# Patient Record
Sex: MALE | Race: BLACK OR AFRICAN AMERICAN | NOT HISPANIC OR LATINO | Employment: STUDENT | URBAN - METROPOLITAN AREA
[De-identification: names, ages, dates, MRNs, and addresses within clinical notes are randomized per-mention and may not be internally consistent; named-entity substitution may affect disease eponyms.]

---
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
## 2024-01-16 ENCOUNTER — OFFICE VISIT (OUTPATIENT)
Dept: FAMILY MEDICINE | Facility: CLINIC | Age: 19
End: 2024-01-16
Payer: MEDICAID

## 2024-01-16 VITALS
BODY MASS INDEX: 29.19 KG/M2 | HEART RATE: 90 BPM | WEIGHT: 192 LBS | OXYGEN SATURATION: 99 % | TEMPERATURE: 98 F | SYSTOLIC BLOOD PRESSURE: 136 MMHG | DIASTOLIC BLOOD PRESSURE: 80 MMHG

## 2024-01-16 DIAGNOSIS — J06.9 VIRAL URI: Primary | ICD-10-CM

## 2024-01-16 PROCEDURE — 99213 OFFICE O/P EST LOW 20 MIN: CPT | Mod: ,,, | Performed by: PEDIATRICS

## 2024-01-16 PROCEDURE — 3075F SYST BP GE 130 - 139MM HG: CPT | Mod: CPTII,,, | Performed by: PEDIATRICS

## 2024-01-16 PROCEDURE — 3079F DIAST BP 80-89 MM HG: CPT | Mod: CPTII,,, | Performed by: PEDIATRICS

## 2024-01-16 PROCEDURE — 3008F BODY MASS INDEX DOCD: CPT | Mod: CPTII,,, | Performed by: PEDIATRICS

## 2024-01-16 PROCEDURE — 1160F RVW MEDS BY RX/DR IN RCRD: CPT | Mod: CPTII,,, | Performed by: PEDIATRICS

## 2024-01-16 PROCEDURE — 1159F MED LIST DOCD IN RCRD: CPT | Mod: CPTII,,, | Performed by: PEDIATRICS

## 2024-01-16 RX ORDER — FLUTICASONE PROPIONATE 50 MCG
2 SPRAY, SUSPENSION (ML) NASAL DAILY
COMMUNITY

## 2024-01-16 NOTE — PROGRESS NOTES
Subjective     Patient ID: Сергей Jaramillo is a 18 y.o. male.    Chief Complaint: Sinus Problem    Sinus Problem         He's been sick 3-4 days.  He has nasal congestion, cough, nasal congestion.  He does not have fever or breathing problems. He thinks the symptoms are a little better.     Objective     Physical Exam     He looks well  Conjunctiva clear  Mild nasal congestion  TM not well seen due to wax but look ok  Mouth and throat are normal  Heart RRR without murmurs  Lungs clear, normal breathing, no wheezing    Assessment and Plan     1. Viral URI      Continue current care - OTC medicine as needed  If the symptoms are worsening by the end of this week then call

## 2024-05-20 ENCOUNTER — OFFICE VISIT (OUTPATIENT)
Dept: FAMILY MEDICINE | Facility: CLINIC | Age: 19
End: 2024-05-20
Payer: MEDICAID

## 2024-05-20 VITALS
WEIGHT: 177.19 LBS | HEIGHT: 67 IN | BODY MASS INDEX: 27.81 KG/M2 | SYSTOLIC BLOOD PRESSURE: 110 MMHG | TEMPERATURE: 98 F | OXYGEN SATURATION: 98 % | DIASTOLIC BLOOD PRESSURE: 62 MMHG | HEART RATE: 98 BPM

## 2024-05-20 DIAGNOSIS — J06.9 VIRAL UPPER RESPIRATORY TRACT INFECTION: Primary | ICD-10-CM

## 2024-05-20 DIAGNOSIS — J02.9 SORE THROAT: ICD-10-CM

## 2024-05-20 LAB
CTP QC/QA: YES
S PYO RRNA THROAT QL PROBE: NEGATIVE

## 2024-05-20 PROCEDURE — 3078F DIAST BP <80 MM HG: CPT | Mod: CPTII,,, | Performed by: NURSE PRACTITIONER

## 2024-05-20 PROCEDURE — 3008F BODY MASS INDEX DOCD: CPT | Mod: CPTII,,, | Performed by: NURSE PRACTITIONER

## 2024-05-20 PROCEDURE — 87880 STREP A ASSAY W/OPTIC: CPT | Mod: QW,,, | Performed by: NURSE PRACTITIONER

## 2024-05-20 PROCEDURE — 3074F SYST BP LT 130 MM HG: CPT | Mod: CPTII,,, | Performed by: NURSE PRACTITIONER

## 2024-05-20 PROCEDURE — 99213 OFFICE O/P EST LOW 20 MIN: CPT | Mod: ,,, | Performed by: NURSE PRACTITIONER

## 2024-05-20 PROCEDURE — 1159F MED LIST DOCD IN RCRD: CPT | Mod: CPTII,,, | Performed by: NURSE PRACTITIONER

## 2024-05-20 NOTE — PROGRESS NOTES
"SUBJECTIVE:  Сергей Jaramillo is a 18 y.o. male here for Sore Throat and Cough      Sore Throat   Associated symptoms include coughing.   Cough  Associated symptoms include a sore throat.     Presents tot he clinic with c/o sore throat and cough.  Symptoms for 2 days.  Denies any fever or n/v/d.   Dom allergies, medications, history, and problem list were updated as appropriate.    Review of Systems   HENT:  Positive for sore throat.    Respiratory:  Positive for cough.       A comprehensive review of symptoms was completed and negative except as noted above.    Recent Results (from the past 504 hour(s))   POCT Rapid Strep A    Collection Time: 05/20/24  3:30 PM   Result Value Ref Range    Rapid Strep A Screen Negative Negative     Acceptable Yes        OBJECTIVE:  Vital signs  Vitals:    05/20/24 1502   BP: 110/62   BP Location: Right arm   Patient Position: Sitting   Pulse: 98   Temp: 98 °F (36.7 °C)   TempSrc: Oral   SpO2: 98%   Weight: 80.4 kg (177 lb 3.2 oz)   Height: 5' 7.32" (1.71 m)        Physical Exam  Constitutional:       Appearance: Normal appearance.   HENT:      Head: Normocephalic and atraumatic.      Right Ear: Tympanic membrane, ear canal and external ear normal.      Left Ear: Tympanic membrane, ear canal and external ear normal.      Nose: Congestion present.      Mouth/Throat:      Mouth: Mucous membranes are moist.      Pharynx: Oropharynx is clear. Posterior oropharyngeal erythema (mild with PND) present.   Eyes:      Conjunctiva/sclera: Conjunctivae normal.   Cardiovascular:      Rate and Rhythm: Normal rate and regular rhythm.   Pulmonary:      Effort: Pulmonary effort is normal.      Breath sounds: Normal breath sounds.   Abdominal:      General: Bowel sounds are normal.      Palpations: Abdomen is soft.   Musculoskeletal:         General: Normal range of motion.      Cervical back: Normal range of motion and neck supple.   Lymphadenopathy:      Cervical: Cervical " adenopathy present.   Skin:     General: Skin is warm.      Capillary Refill: Capillary refill takes less than 2 seconds.   Neurological:      Mental Status: He is alert.   Psychiatric:         Mood and Affect: Mood normal.         Behavior: Behavior normal.         Judgment: Judgment normal.          ASSESSMENT/PLAN:  1. Viral upper respiratory tract infection  Comments:  increase fluids, OTC congestion/cough med of patient's choice, tylenol/ibuprofen prn fever/pain    2. Sore throat  -     POCT Rapid Strep A        Follow Up:  Follow up if symptoms worsen or fail to improve.

## 2024-07-11 DIAGNOSIS — J30.89 SEASONAL ALLERGIC RHINITIS DUE TO OTHER ALLERGIC TRIGGER: ICD-10-CM

## 2024-07-11 RX ORDER — LORATADINE 10 MG/1
10 TABLET ORAL
Qty: 90 TABLET | Refills: 2 | Status: SHIPPED | OUTPATIENT
Start: 2024-07-11

## 2024-10-01 ENCOUNTER — TELEPHONE (OUTPATIENT)
Dept: FAMILY MEDICINE | Facility: CLINIC | Age: 19
End: 2024-10-01
Payer: MEDICAID

## 2024-10-01 NOTE — TELEPHONE ENCOUNTER
Spoke to pt, he started two days ago with a scratchy throat and sinus drip.  He will take otc meds and encourage fluids.  He will call back if not improving.

## 2024-12-10 ENCOUNTER — TELEPHONE (OUTPATIENT)
Dept: FAMILY MEDICINE | Facility: CLINIC | Age: 19
End: 2024-12-10
Payer: MEDICAID

## 2024-12-11 ENCOUNTER — OFFICE VISIT (OUTPATIENT)
Dept: FAMILY MEDICINE | Facility: CLINIC | Age: 19
End: 2024-12-11
Payer: MEDICAID

## 2024-12-11 VITALS
DIASTOLIC BLOOD PRESSURE: 68 MMHG | OXYGEN SATURATION: 99 % | WEIGHT: 183.81 LBS | HEIGHT: 67 IN | SYSTOLIC BLOOD PRESSURE: 116 MMHG | BODY MASS INDEX: 28.85 KG/M2 | HEART RATE: 84 BPM | TEMPERATURE: 97 F

## 2024-12-11 DIAGNOSIS — R45.89 DEPRESSED MOOD: Primary | ICD-10-CM

## 2024-12-11 PROCEDURE — 1160F RVW MEDS BY RX/DR IN RCRD: CPT | Mod: CPTII,,, | Performed by: PEDIATRICS

## 2024-12-11 PROCEDURE — 1159F MED LIST DOCD IN RCRD: CPT | Mod: CPTII,,, | Performed by: PEDIATRICS

## 2024-12-11 PROCEDURE — 99213 OFFICE O/P EST LOW 20 MIN: CPT | Mod: ,,, | Performed by: PEDIATRICS

## 2024-12-11 PROCEDURE — 3074F SYST BP LT 130 MM HG: CPT | Mod: CPTII,,, | Performed by: PEDIATRICS

## 2024-12-11 PROCEDURE — 3008F BODY MASS INDEX DOCD: CPT | Mod: CPTII,,, | Performed by: PEDIATRICS

## 2024-12-11 PROCEDURE — 3078F DIAST BP <80 MM HG: CPT | Mod: CPTII,,, | Performed by: PEDIATRICS

## 2024-12-23 NOTE — PROGRESS NOTES
Subjective     Patient ID: Сергей Jaramillo is a 19 y.o. male.    Chief Complaint: Depression    HPI    He's been having feelings of sadness and depression over the last few months.  He has decreased motivation. He is somewhat anxious. He sleeps ok most nights.  He does not have suicidal thoughts.      Objective     Physical Exam    He does not appear to be in distress   We mostly talked about his symptoms for 25 minutes    Assessment and Plan     1. Depressed mood      We talked about healthy coping skills and healthy lifestyle choices regarding diet, hydration, sleep, use of social media. We discussed avoiding harmful substances that could be making things worse.  I recommended he see a counselor and he says he has a close family friend that he talk to.  He does not want to start a medicine at this time but will let me know if he has worsening symptoms over the next few weeks.

## 2025-05-21 ENCOUNTER — TELEPHONE (OUTPATIENT)
Dept: FAMILY MEDICINE | Facility: CLINIC | Age: 20
End: 2025-05-21

## 2025-05-21 ENCOUNTER — OFFICE VISIT (OUTPATIENT)
Dept: FAMILY MEDICINE | Facility: CLINIC | Age: 20
End: 2025-05-21
Payer: MEDICAID

## 2025-05-21 VITALS
WEIGHT: 166.19 LBS | HEIGHT: 67 IN | BODY MASS INDEX: 26.08 KG/M2 | TEMPERATURE: 99 F | HEART RATE: 89 BPM | SYSTOLIC BLOOD PRESSURE: 110 MMHG | OXYGEN SATURATION: 98 % | DIASTOLIC BLOOD PRESSURE: 84 MMHG

## 2025-05-21 DIAGNOSIS — J02.9 SORE THROAT: Primary | ICD-10-CM

## 2025-05-21 LAB
CTP QC/QA: YES
MOLECULAR STREP A: POSITIVE

## 2025-05-21 RX ORDER — AZITHROMYCIN 250 MG/1
TABLET, FILM COATED ORAL
Qty: 6 TABLET | Refills: 0 | Status: SHIPPED | OUTPATIENT
Start: 2025-05-21 | End: 2025-05-26

## 2025-05-21 NOTE — PROGRESS NOTES
Subjective     Patient ID: Сергей Jaramillo is a 19 y.o. male.    Chief Complaint: Sore Throat (X2 DAYS)        He's been sick for 2 days with sore throat, subjective fever, achy muscles, headache.        Objective     Physical Exam  Constitutional:       Appearance: Normal appearance.   HENT:      Right Ear: Tympanic membrane normal.      Left Ear: Tympanic membrane normal.   Eyes:      Conjunctiva/sclera: Conjunctivae normal.   Cardiovascular:      Rate and Rhythm: Normal rate and regular rhythm.      Heart sounds: Normal heart sounds. No murmur heard.     No friction rub. No gallop.   Pulmonary:      Effort: Pulmonary effort is normal.      Breath sounds: Normal breath sounds.   Abdominal:      General: Abdomen is flat. Bowel sounds are normal.      Palpations: Abdomen is soft.      Tenderness: There is no abdominal tenderness.   Musculoskeletal:         General: Normal range of motion.      Cervical back: Normal range of motion and neck supple.   Lymphadenopathy:      Cervical: No cervical adenopathy.   Psychiatric:         Mood and Affect: Mood normal.         Behavior: Behavior normal.          Assessment and Plan     1. Sore throat  -     POCT Strep A, Molecular    Other orders  -     azithromycin (Z-SINTIA) 250 MG tablet; Take 2 tablets by mouth on day 1; Take 1 tablet by mouth on days 2-5  Dispense: 6 tablet; Refill: 0      Throat swab for rapid strep test is positive      Hydration   OTC medicine as needed